# Patient Record
Sex: FEMALE | Race: WHITE | NOT HISPANIC OR LATINO
[De-identification: names, ages, dates, MRNs, and addresses within clinical notes are randomized per-mention and may not be internally consistent; named-entity substitution may affect disease eponyms.]

---

## 2017-01-11 ENCOUNTER — APPOINTMENT (OUTPATIENT)
Dept: OPHTHALMOLOGY | Facility: CLINIC | Age: 77
End: 2017-01-11

## 2017-02-13 ENCOUNTER — APPOINTMENT (OUTPATIENT)
Dept: OPHTHALMOLOGY | Facility: CLINIC | Age: 77
End: 2017-02-13

## 2017-02-13 DIAGNOSIS — H52.13 MYOPIA, BILATERAL: ICD-10-CM

## 2017-02-13 DIAGNOSIS — H52.203 MYOPIA, BILATERAL: ICD-10-CM

## 2017-02-13 DIAGNOSIS — H52.4 MYOPIA, BILATERAL: ICD-10-CM

## 2018-01-31 ENCOUNTER — APPOINTMENT (OUTPATIENT)
Dept: OPHTHALMOLOGY | Facility: CLINIC | Age: 78
End: 2018-01-31
Payer: MEDICARE

## 2018-01-31 ENCOUNTER — APPOINTMENT (OUTPATIENT)
Dept: OPHTHALMOLOGY | Facility: CLINIC | Age: 78
End: 2018-01-31

## 2018-01-31 DIAGNOSIS — H04.123 DRY EYE SYNDROME OF BILATERAL LACRIMAL GLANDS: ICD-10-CM

## 2018-01-31 PROCEDURE — 92014 COMPRE OPH EXAM EST PT 1/>: CPT

## 2019-07-10 ENCOUNTER — NON-APPOINTMENT (OUTPATIENT)
Age: 79
End: 2019-07-10

## 2019-07-10 ENCOUNTER — APPOINTMENT (OUTPATIENT)
Dept: OPHTHALMOLOGY | Facility: CLINIC | Age: 79
End: 2019-07-10
Payer: MEDICARE

## 2019-07-10 PROCEDURE — 92014 COMPRE OPH EXAM EST PT 1/>: CPT

## 2021-03-22 ENCOUNTER — APPOINTMENT (OUTPATIENT)
Dept: OPHTHALMOLOGY | Facility: CLINIC | Age: 81
End: 2021-03-22
Payer: MEDICARE

## 2021-03-22 ENCOUNTER — NON-APPOINTMENT (OUTPATIENT)
Age: 81
End: 2021-03-22

## 2021-03-22 PROCEDURE — 92014 COMPRE OPH EXAM EST PT 1/>: CPT

## 2022-06-15 ENCOUNTER — APPOINTMENT (OUTPATIENT)
Dept: OPHTHALMOLOGY | Facility: CLINIC | Age: 82
End: 2022-06-15
Payer: MEDICARE

## 2022-06-15 ENCOUNTER — NON-APPOINTMENT (OUTPATIENT)
Age: 82
End: 2022-06-15

## 2022-06-15 PROCEDURE — 92014 COMPRE OPH EXAM EST PT 1/>: CPT

## 2023-06-26 ENCOUNTER — APPOINTMENT (OUTPATIENT)
Dept: OPHTHALMOLOGY | Facility: CLINIC | Age: 83
End: 2023-06-26

## 2023-07-19 ENCOUNTER — NON-APPOINTMENT (OUTPATIENT)
Age: 83
End: 2023-07-19

## 2023-07-19 ENCOUNTER — APPOINTMENT (OUTPATIENT)
Dept: OPHTHALMOLOGY | Facility: CLINIC | Age: 83
End: 2023-07-19
Payer: MEDICARE

## 2023-07-19 PROCEDURE — 92014 COMPRE OPH EXAM EST PT 1/>: CPT

## 2023-08-11 ENCOUNTER — APPOINTMENT (OUTPATIENT)
Dept: OPHTHALMOLOGY | Facility: CLINIC | Age: 83
End: 2023-08-11
Payer: MEDICARE

## 2023-08-11 ENCOUNTER — NON-APPOINTMENT (OUTPATIENT)
Age: 83
End: 2023-08-11

## 2023-08-11 PROCEDURE — 92012 INTRM OPH EXAM EST PATIENT: CPT

## 2023-12-24 ENCOUNTER — HOSPITAL ENCOUNTER (EMERGENCY)
Facility: HOSPITAL | Age: 83
Discharge: HOME | End: 2023-12-24
Attending: EMERGENCY MEDICINE
Payer: MEDICARE

## 2023-12-24 ENCOUNTER — APPOINTMENT (EMERGENCY)
Dept: CT IMAGING | Facility: HOSPITAL | Age: 83
End: 2023-12-24
Payer: MEDICARE

## 2023-12-24 ENCOUNTER — APPOINTMENT (EMERGENCY)
Dept: RADIOLOGY | Facility: HOSPITAL | Age: 83
End: 2023-12-24
Payer: MEDICARE

## 2023-12-24 VITALS
HEIGHT: 66 IN | HEART RATE: 69 BPM | WEIGHT: 185 LBS | SYSTOLIC BLOOD PRESSURE: 149 MMHG | DIASTOLIC BLOOD PRESSURE: 70 MMHG | BODY MASS INDEX: 29.73 KG/M2 | OXYGEN SATURATION: 97 % | RESPIRATION RATE: 20 BRPM | TEMPERATURE: 97.9 F

## 2023-12-24 DIAGNOSIS — R06.00 DYSPNEA: Primary | ICD-10-CM

## 2023-12-24 LAB
ALBUMIN SERPL BCP-MCNC: 4.2 G/DL (ref 3.5–5)
ALP SERPL-CCNC: 81 U/L (ref 34–104)
ALT SERPL W P-5'-P-CCNC: 47 U/L (ref 7–52)
ANION GAP SERPL CALCULATED.3IONS-SCNC: 6 MMOL/L
AST SERPL W P-5'-P-CCNC: 53 U/L (ref 13–39)
ATRIAL RATE: 63 BPM
BASOPHILS # BLD AUTO: 0.08 THOUSANDS/ÂΜL (ref 0–0.1)
BASOPHILS NFR BLD AUTO: 1 % (ref 0–1)
BILIRUB DIRECT SERPL-MCNC: 0.1 MG/DL (ref 0–0.2)
BILIRUB SERPL-MCNC: 0.52 MG/DL (ref 0.2–1)
BNP SERPL-MCNC: 193 PG/ML (ref 0–100)
BUN SERPL-MCNC: 27 MG/DL (ref 5–25)
CALCIUM SERPL-MCNC: 9.6 MG/DL (ref 8.4–10.2)
CARDIAC TROPONIN I PNL SERPL HS: 3 NG/L
CHLORIDE SERPL-SCNC: 108 MMOL/L (ref 96–108)
CO2 SERPL-SCNC: 26 MMOL/L (ref 21–32)
CREAT SERPL-MCNC: 1.01 MG/DL (ref 0.6–1.3)
D DIMER PPP FEU-MCNC: 0.48 UG/ML FEU
EOSINOPHIL # BLD AUTO: 0.14 THOUSAND/ÂΜL (ref 0–0.61)
EOSINOPHIL NFR BLD AUTO: 2 % (ref 0–6)
ERYTHROCYTE [DISTWIDTH] IN BLOOD BY AUTOMATED COUNT: 13.1 % (ref 11.6–15.1)
FLUAV RNA RESP QL NAA+PROBE: NEGATIVE
FLUBV RNA RESP QL NAA+PROBE: NEGATIVE
GFR SERPL CREATININE-BSD FRML MDRD: 51 ML/MIN/1.73SQ M
GLUCOSE SERPL-MCNC: 88 MG/DL (ref 65–140)
HCT VFR BLD AUTO: 43.1 % (ref 34.8–46.1)
HGB BLD-MCNC: 14.2 G/DL (ref 11.5–15.4)
IMM GRANULOCYTES # BLD AUTO: 0.01 THOUSAND/UL (ref 0–0.2)
IMM GRANULOCYTES NFR BLD AUTO: 0 % (ref 0–2)
LYMPHOCYTES # BLD AUTO: 2.33 THOUSANDS/ÂΜL (ref 0.6–4.47)
LYMPHOCYTES NFR BLD AUTO: 32 % (ref 14–44)
MCH RBC QN AUTO: 31.6 PG (ref 26.8–34.3)
MCHC RBC AUTO-ENTMCNC: 32.9 G/DL (ref 31.4–37.4)
MCV RBC AUTO: 96 FL (ref 82–98)
MONOCYTES # BLD AUTO: 0.43 THOUSAND/ÂΜL (ref 0.17–1.22)
MONOCYTES NFR BLD AUTO: 6 % (ref 4–12)
NEUTROPHILS # BLD AUTO: 4.24 THOUSANDS/ÂΜL (ref 1.85–7.62)
NEUTS SEG NFR BLD AUTO: 59 % (ref 43–75)
NRBC BLD AUTO-RTO: 0 /100 WBCS
P AXIS: 87 DEGREES
PLATELET # BLD AUTO: 191 THOUSANDS/UL (ref 149–390)
PMV BLD AUTO: 12.4 FL (ref 8.9–12.7)
POTASSIUM SERPL-SCNC: 3.8 MMOL/L (ref 3.5–5.3)
PR INTERVAL: 280 MS
PROT SERPL-MCNC: 7.4 G/DL (ref 6.4–8.4)
QRS AXIS: 88 DEGREES
QRSD INTERVAL: 80 MS
QT INTERVAL: 434 MS
QTC INTERVAL: 444 MS
RBC # BLD AUTO: 4.5 MILLION/UL (ref 3.81–5.12)
RSV RNA RESP QL NAA+PROBE: NEGATIVE
SARS-COV-2 RNA RESP QL NAA+PROBE: NEGATIVE
SODIUM SERPL-SCNC: 140 MMOL/L (ref 135–147)
T WAVE AXIS: 87 DEGREES
VENTRICULAR RATE: 63 BPM
WBC # BLD AUTO: 7.23 THOUSAND/UL (ref 4.31–10.16)

## 2023-12-24 PROCEDURE — 84484 ASSAY OF TROPONIN QUANT: CPT | Performed by: EMERGENCY MEDICINE

## 2023-12-24 PROCEDURE — 83880 ASSAY OF NATRIURETIC PEPTIDE: CPT | Performed by: EMERGENCY MEDICINE

## 2023-12-24 PROCEDURE — 80048 BASIC METABOLIC PNL TOTAL CA: CPT | Performed by: EMERGENCY MEDICINE

## 2023-12-24 PROCEDURE — 0241U HB NFCT DS VIR RESP RNA 4 TRGT: CPT | Performed by: EMERGENCY MEDICINE

## 2023-12-24 PROCEDURE — 99285 EMERGENCY DEPT VISIT HI MDM: CPT

## 2023-12-24 PROCEDURE — 99285 EMERGENCY DEPT VISIT HI MDM: CPT | Performed by: EMERGENCY MEDICINE

## 2023-12-24 PROCEDURE — 71045 X-RAY EXAM CHEST 1 VIEW: CPT

## 2023-12-24 PROCEDURE — 71275 CT ANGIOGRAPHY CHEST: CPT

## 2023-12-24 PROCEDURE — 36415 COLL VENOUS BLD VENIPUNCTURE: CPT | Performed by: EMERGENCY MEDICINE

## 2023-12-24 PROCEDURE — G1004 CDSM NDSC: HCPCS

## 2023-12-24 PROCEDURE — 80076 HEPATIC FUNCTION PANEL: CPT | Performed by: EMERGENCY MEDICINE

## 2023-12-24 PROCEDURE — 85025 COMPLETE CBC W/AUTO DIFF WBC: CPT | Performed by: EMERGENCY MEDICINE

## 2023-12-24 PROCEDURE — 85379 FIBRIN DEGRADATION QUANT: CPT | Performed by: EMERGENCY MEDICINE

## 2023-12-24 PROCEDURE — 93005 ELECTROCARDIOGRAM TRACING: CPT

## 2023-12-24 RX ADMIN — IOHEXOL 85 ML: 350 INJECTION, SOLUTION INTRAVENOUS at 11:33

## 2023-12-24 NOTE — DISCHARGE INSTRUCTIONS
Sign of acute pathology at this time  Follow-up with your provider return worsening symptoms or any problems

## 2023-12-24 NOTE — ED PROVIDER NOTES
History  Chief Complaint   Patient presents with    Shortness of Breath     C/o sob since late afternoon yesterday.      HPI patient is a 83-year-old female presents to the emergency department with an episode of shortness of breath.  Apparently patient has had some dyspnea with exertion since yesterday and today apparently they have a home pulse oximeter which was reading in the 70s.  Patient denies any chest pain.  There is no cough or congestion.  She denies any fever or chills.  Denies any sputum production.  She reports no underlying history of asthma or lung disease.  Patient reports a trip from [place] recently apparently was traveling and sedentary for a while.  She denies any direct trauma to her chest.  Denies any leg swelling.  She denies any previous congestive heart failure.  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  She reports that other than that she has what they have told her is a normal heart.  Past medical history of conduction disease patient has a pacemaker  Family history noncontributory  Social history, apparently traveled from [place], non-smoker    None       History reviewed. No pertinent past medical history.    History reviewed. No pertinent surgical history.    History reviewed. No pertinent family history.  I have reviewed and agree with the history as documented.    E-Cigarette/Vaping     E-Cigarette/Vaping Substances     Social History     Tobacco Use    Smoking status: Never    Smokeless tobacco: Never   Substance Use Topics    Alcohol use: Never    Drug use: Never       Review of Systems   Constitutional:  Negative for diaphoresis, fatigue and fever.   HENT:  Negative for congestion, ear pain, nosebleeds and sore throat.    Eyes:  Negative for photophobia, pain, discharge and visual disturbance.   Respiratory:  Positive for shortness of breath. Negative for cough, choking, chest tightness and wheezing.    Cardiovascular:   Negative for chest pain and palpitations.   Gastrointestinal:  Negative for abdominal distention, abdominal pain, diarrhea and vomiting.   Genitourinary:  Negative for dysuria, flank pain and frequency.   Musculoskeletal:  Negative for back pain, gait problem and joint swelling.   Skin:  Negative for color change and rash.   Neurological:  Negative for dizziness, syncope and headaches.   Psychiatric/Behavioral:  Negative for behavioral problems and confusion. The patient is not nervous/anxious.    All other systems reviewed and are negative.      Physical Exam  Physical Exam  Vitals and nursing note reviewed.   Constitutional:       Appearance: She is well-developed.   HENT:      Head: Normocephalic.      Right Ear: External ear normal.      Left Ear: External ear normal.      Nose: Nose normal.      Mouth/Throat:      Mouth: Mucous membranes are moist.      Pharynx: Oropharynx is clear.   Eyes:      General: Lids are normal.      Extraocular Movements: Extraocular movements intact.      Pupils: Pupils are equal, round, and reactive to light.   Cardiovascular:      Rate and Rhythm: Normal rate and regular rhythm.      Pulses: Normal pulses.      Heart sounds: Normal heart sounds.   Pulmonary:      Effort: Pulmonary effort is normal. No respiratory distress.      Breath sounds: Normal breath sounds.   Abdominal:      General: Abdomen is flat. Bowel sounds are normal.   Musculoskeletal:         General: No deformity. Normal range of motion.      Cervical back: Normal range of motion and neck supple.   Skin:     General: Skin is warm and dry.   Neurological:      Mental Status: She is alert and oriented to person, place, and time.   Psychiatric:         Mood and Affect: Mood normal.         Vital Signs  ED Triage Vitals [12/24/23 1016]   Temperature Pulse Respirations Blood Pressure SpO2   97.9 °F (36.6 °C) 64 22 (!) 181/94 92 %      Temp Source Heart Rate Source Patient Position - Orthostatic VS BP Location FiO2 (%)    Oral Monitor Sitting Left arm --      Pain Score       --           Vitals:    12/24/23 1016 12/24/23 1305   BP: (!) 181/94 149/70   Pulse: 64 69   Patient Position - Orthostatic VS: Sitting          Visual Acuity      ED Medications  Medications   iohexol (OMNIPAQUE) 350 MG/ML injection (MULTI-DOSE) 85 mL (85 mL Intravenous Given 12/24/23 1133)       Diagnostic Studies  Results Reviewed       Procedure Component Value Units Date/Time    FLU/RSV/COVID - if FLU/RSV clinically relevant [[id number]]  (Normal) Collected: 12/24/23 1042    Lab Status: Final result Specimen: Nares from Nose Updated: 12/24/23 1142     SARS-CoV-2 Negative     INFLUENZA A PCR Negative     INFLUENZA B PCR Negative     RSV PCR Negative    Narrative:      FOR PEDIATRIC PATIENTS - copy/paste COVID Guidelines URL to browser: [email or web address]    SARS-CoV-2 assay is a Nucleic Acid Amplification assay intended for the  qualitative detection of nucleic acid from SARS-CoV-2 in nasopharyngeal  swabs. Results are for the presumptive identification of SARS-CoV-2 RNA.    Positive results are indicative of infection with SARS-CoV-2, the virus  causing COVID-19, but do not rule out bacterial infection or co-infection  with other viruses. Laboratories within the [place] and its  territories are required to report all positive results to the appropriate  public health authorities. Negative results do not preclude SARS-CoV-2  infection and should not be used as the sole basis for treatment or other  patient management decisions. Negative results must be combined with  clinical observations, patient history, and epidemiological information.  This test has not been FDA cleared or approved.    This test has been authorized by FDA under an Emergency Use Authorization  (EUA). This test is only authorized for the duration of time the  declaration that circumstances exist justifying the authorization of  the  emergency use of an in vitro diagnostic tests for detection of SARS-CoV-2  virus and/or diagnosis of COVID-19 infection under section 564(b)(1) of  the Act, 21 U.S.C. 360bbb-3(b)(1), unless the authorization is terminated  or revoked sooner. The test has been validated but independent review by FDA  and CLIA is pending.    Test performed using [employer] Gene[employer]pert: This RT-PCR assay targets N2,  a region unique to SARS-CoV-2. A conserved region in the E-gene was chosen  for pan-Sarbecovirus detection which includes SARS-CoV-2.    According to CMS-2020-01-R, this platform meets the definition of high-throughput technology.    B-Type Natriuretic Peptide(BNP) [[id number]]  (Abnormal) Collected: 12/24/23 1042    Lab Status: Final result Specimen: Blood from Arm, Left Updated: 12/24/23 1129      pg/mL     HS Troponin 0hr (reflex protocol) [[id number]]  (Normal) Collected: 12/24/23 1042    Lab Status: Final result Specimen: Blood from Arm, Left Updated: 12/24/23 1128     hs TnI 0hr 3 ng/L     Basic metabolic panel [[id number]]  (Abnormal) Collected: 12/24/23 1042    Lab Status: Final result Specimen: Blood from Arm, Left Updated: 12/24/23 1125     Sodium 140 mmol/L      Potassium 3.8 mmol/L      Chloride 108 mmol/L      CO2 26 mmol/L      ANION GAP 6 mmol/L      BUN 27 mg/dL      Creatinine 1.01 mg/dL      Glucose 88 mg/dL      Calcium 9.6 mg/dL      eGFR 51 ml/min/1.73sq m     Narrative:      National Kidney Disease Foundation guidelines for Chronic Kidney Disease (CKD):     Stage 1 with normal or high GFR (GFR > 90 mL/min/1.73 square meters)    Stage 2 Mild CKD (GFR = 60-89 mL/min/1.73 square meters)    Stage 3A Moderate CKD (GFR = 45-59 mL/min/1.73 square meters)    Stage 3B Moderate CKD (GFR = 30-44 mL/min/1.73 square meters)    Stage 4 Severe CKD (GFR = 15-29 mL/min/1.73 square meters)    Stage 5 End Stage CKD (GFR <15 mL/min/1.73 square meters)  Note: GFR calculation is accurate only with a steady state  creatinine    Hepatic function panel [[id number]]  (Abnormal) Collected: 12/24/23 1042    Lab Status: Final result Specimen: Blood from Arm, Left Updated: 12/24/23 1125     Total Bilirubin 0.52 mg/dL      Bilirubin, Direct 0.10 mg/dL      Alkaline Phosphatase 81 U/L      AST 53 U/L      ALT 47 U/L      Total Protein 7.4 g/dL      Albumin 4.2 g/dL     D-dimer, quantitative [[id number]]  (Normal) Collected: 12/24/23 1042    Lab Status: Final result Specimen: Blood from Arm, Left Updated: 12/24/23 1118     D-Dimer, Quant 0.48 ug/ml FEU     Narrative:      In the evaluation for possible pulmonary embolism, in the appropriate (Well's Score of 4 or less) patient, the age adjusted d-dimer cutoff for this patient can be calculated as:    Age x 0.01 (in ug/mL) for Age-adjusted D-dimer exclusion threshold for a patient over 50 years.    CBC and differential [[id number]] Collected: 12/24/23 1042    Lab Status: Final result Specimen: Blood from Arm, Left Updated: 12/24/23 1049     WBC 7.23 Thousand/uL      RBC 4.50 Million/uL      Hemoglobin 14.2 g/dL      Hematocrit 43.1 %      MCV 96 fL      MCH 31.6 pg      MCHC 32.9 g/dL      RDW 13.1 %      MPV 12.4 fL      Platelets 191 Thousands/uL      nRBC 0 /100 WBCs      Neutrophils Relative 59 %      Immat GRANS % 0 %      Lymphocytes Relative 32 %      Monocytes Relative 6 %      Eosinophils Relative 2 %      Basophils Relative 1 %      Neutrophils Absolute 4.24 Thousands/µL      Immature Grans Absolute 0.01 Thousand/uL      Lymphocytes Absolute 2.33 Thousands/µL      Monocytes Absolute 0.43 Thousand/µL      Eosinophils Absolute 0.14 Thousand/µL      Basophils Absolute 0.08 Thousands/µL                    CTA ED chest PE Study   Final Result by Francisco Crooks MD (12/24 1229)      No filling defect to suggest acute or chronic pulmonary embolism in the entire pulmonary arterial system. No acute findings in the chest.            Workstation performed: [id number]         XR chest  1 view portable   Final Result by Francisco Crooks MD (12/24 1228)      No acute cardiopulmonary disease.                  Workstation performed: [id number]                    Procedures  ECG 12 Lead Documentation Only    Date/Time: 12/24/2023 10:33 AM    Performed by: Carlos Correia MD  Authorized by: Carlos Correia MD    Indications / Diagnosis:  Shortness of breath hypoxia  ECG reviewed by me, the ED Provider: yes    Patient location:  ED  Previous ECG:     Previous ECG:  Unavailable  Interpretation:     Interpretation: non-specific    Rate:     ECG rate:  63    ECG rate assessment: normal    Rhythm:     Rhythm: sinus rhythm    Comments:      Normal sinus rhythm, no acute ST-T wave changes           ED Course       Chest x-ray: Chest x-ray showed a normal cardiac silhouette, no pneumothorax no infiltrates, No sign of pathology, interpreted by me, I was the primary .    Diagnostic testing shows negative SARS testing negative influenza negative RSV  Cardiac troponin was 3 no sign of cardiac ischemia  BNP was 193 minimally elevated nonspecific finding  Electrolytes are within normal limits other than a BUN of 27 possible some mild dehydration  Liver functions were normal no sign of hepatitis.  Patient had a negative D-dimer at 0.48 but gave a significant history for sudden onset shortness of breath and the chest x-ray was normal.  I do not have another explanation for her pulse oximetry in the 70s prior to arrival other than a broken pulse oximeter or pulmonary embolism therefore CT was required.  CT was negative.                        SBIRT 22yo+      Flowsheet Row Most Recent Value   Initial Alcohol Screen: US AUDIT-C     1. How often do you have a drink containing alcohol? 0 Filed at: 12/24/2023 1018   2. How many drinks containing alcohol do you have on a typical day you are drinking?  0 Filed at: 12/24/2023 1018   3a. Male UNDER 65: How often do you have five or more drinks on one occasion? 0  Filed at: 12/24/2023 1018   3b. FEMALE Any Age, or MALE 65+: How often do you have 4 or more drinks on one occassion? 0 Filed at: 12/24/2023 1018   Audit-C Score 0 Filed at: 12/24/2023 1018   DAVION: How many times in the past year have you...    Used an illegal drug or used a prescription medication for non-medical reasons? Never Filed at: 12/24/2023 1018            Wells' Criteria for PE      Flowsheet Row Most Recent Value   Wells' Criteria for PE    Clinical signs and symptoms of DVT 0 Filed at: 12/24/2023 1033   PE is primary diagnosis or equally likely 3 Filed at: 12/24/2023 1033   HR >100 0 Filed at: 12/24/2023 1033   Immobilization at least 3 days or Surgery in the previous 4 weeks 1.5 Filed at: 12/24/2023 1033   Previous, objectively diagnosed PE or DVT 0 Filed at: 12/24/2023 1033   Hemoptysis 0 Filed at: 12/24/2023 1033   Malignancy with treatment within 6 months or palliative 0 Filed at: 12/24/2023 1033   Wells' Criteria Total 4.5 Filed at: 12/24/2023 1033                  Medical Decision Making  Medical decision making 83-year-old female presents emergency department history of shortness of breath over the last 24 hours describes some dyspnea with exertion.  Then apparently this morning her pulse oximetry was in the 70s according to the family.  Diagnostic workup showed normal cardiac enzymes normal chest x-ray no sign of heart failure.  No sign of pneumonia.  No sign of infection.  Because The patient gave a significant history of pulse oximetry in the 70s and recent car ride I was concerned about pulmonary embolism.  Because the patient reported shortness of breath and hypoxia CT was required to rule out pulmonary embolism that was negative.  I then ambulated the patient in the hallway she did not become dyspneic or hypoxic with ambulation.  Possible the patient did have a broken pulse oximeter or inaccurate.  We discussed treatment and follow-up we discussed indications to return    Amount and/or  Complexity of Data Reviewed  Labs: ordered.  Radiology: ordered.    Risk  Prescription drug management.             Disposition  Final diagnoses:   Dyspnea     Time reflects when diagnosis was documented in both MDM as applicable and the Disposition within this note       Time User Action Codes Description Comment    12/24/2023 12:47 PM Carlos Correia Add [R06.00] Dyspnea           ED Disposition       ED Disposition   Discharge    Condition   Stable    Date/Time   Sun Dec 24, 2023 1247    Comment   Bianca White discharge to home/self care.                   Follow-up Information    None         There are no discharge medications for this patient.      No discharge procedures on file.    PDMP Review       None            ED Provider  Electronically Signed by             Carlos Correia MD  12/24/23 2601